# Patient Record
Sex: FEMALE | Race: BLACK OR AFRICAN AMERICAN
[De-identification: names, ages, dates, MRNs, and addresses within clinical notes are randomized per-mention and may not be internally consistent; named-entity substitution may affect disease eponyms.]

---

## 2017-03-26 NOTE — NUR
PT PRESENTED TO THE ER WITH A C/O LEFT EYE BLURRED VISION/DOUBLE VISION X 1.5 
HR. PT AMBULATED TO BED #2 WITH A STEADY GAIT. PT'S FRIEND IS AT THE BEDSIDE.

## 2017-03-26 NOTE — NUR
Patient does not wish to proceed with medical care recommended by Dr. FELIPE. 
Patient given information related to possible complications, up to and 
including death, which could occur as a result of leaving the hospital at this 
time. Patient verbalizes understanding of risks involved due to leaving against 
medical advice. Patient has signed AMA form WITH DR. FELIPE AT THE BEDSIDE. PT 
AMBULATED OUT WITH A STEADY GAIT. PT'S FRIEND IS DRIVING PT HOME.

## 2018-07-01 NOTE — NUR
pt not given med  per pharn  PT. VERBALIZED UNDERSTANDING OF AFTERCARE 
INSTRUCTIONS.Patient discharged to home in stable condition. Written and verbal 
after care instructions given. Patient verbalizes understanding of instruction.

## 2018-07-02 NOTE — NUR
pt ok to discharge per zina stevens. Patient discharged to home in stable 
condition. Written and verbal after care instructions given. Patient verbalizes 
understanding of instruction.Patient is awake and alert to self, day, and 
place. pt ambulatory with a steady gait

## 2019-01-16 NOTE — ERD
ER Documentation


Chief Complaint


Chief Complaint





intermittent pressure like pain @ left chest radiates to right arm





HPI


53-year-old female presents the emergency department complaining of tingling.


Patient states that over the last few days she has had a nonspecific neuropathic


tingling type discomfort in her right arm.  He then traveled to her right leg.  


She reported no focal weakness, difficulty speaking or significant headache.  


She states she has on and off sensation loss to the right upper and lower 


extremity but no weakness or difficulty speaking.  She reports tingling today in


both upper and lower extremity.  She then states she developed a nonspecific 


discomfort in the right side of her chest without shortness of breath or 


hemoptysis.  She states she has been taking Eliquis up until last week.  She has


no other acute complaints including no fevers, sputum production or other 


complaints.





ROS


All systems reviewed and are negative except as per history of present illness.





Medications


Home Meds


Reported Medications


Apixaban* (Eliquis*) 5 Mg Tablet, 5 MG PO BID, TAB


   19


Discontinued Reported Medications


Apixaban* (Eliquis*) 2.5 Mg Tablet, 10 MG PO BID, TAB


   18





Allergies


Allergies:  


Coded Allergies:  


     Penicillins (Verified  Allergy, Severe, 18)


     Sulfa (Sulfonamide Antibiotics) (Verified  Allergy, Severe, 18)


     iodine (Verified  Allergy, Severe, 18)





PMhx/Soc


History of Surgery:  Yes (right knee)


Anesthesia Reaction:  No


Hx Neurological Disorder:  Yes (legs buckle up when walking; numbness tingling 


legs, feet, hands)


Hx Respiratory Disorders:  No


Hx Cardiac Disorders:  No


Hx Psychiatric Problems:  No


Hx Miscellaneous Medical Probl:  Yes (DVT on Left Thigh, PE)


Hx Alcohol Use:  No


Hx Substance Use:  No


Hx Tobacco Use:  No





FmHx


Family history of blood clots





Physical Exam


Vitals





Vital Signs


  Date      Temp  Pulse  Resp  B/P (MAP)   Pulse Ox  O2          O2 Flow    FiO2


Time                                                 Delivery    Rate


   19  98.2     74    19      132/60


     09:55                           (84)





Physical Exam


GENERAL: The patient is well developed and appropriate for usual state of health


in no apparent distress


HEENT: Pupils equal, round, and reactive to light.  EOMI. There is no scleral 


icterus.


NECK: C-spine is soft and supple, there is no meningismus.  There is no cervical


lymphadenopathy.


LUNGS: Clear to auscultation bilaterally. There are no rales, wheezes or 


rhonchi.


HEART: Regular rate and rhythm, no murmurs, clicks, rubs or gallops.


ABDOMEN: Soft, non-tender, non-distended.  There are bowel sounds in all four 


quadrants. No rebound or guarding.


EXTREMITIES: There is no peripheral cyanosis or edema.  No focal swelling or e


rythema.


NEURO: The patient moves all four extremities with 5/5 strength.  Cranial nerves


II - XII are intact. Normal gait. Alert and oriented


SKIN: There is no apparent rash or petechiae.


HEME/LYMPHATIC: There is no evidence of excessive bruising or lymphedema.


PSYCHIATRIC: The patient does not appear anxious or depressed.


Result Diagram:  


19 1022                                                                    


           19 1022





Results 24 hrs





Laboratory Tests


              Test
                                 19
10:22


              White Blood Count                      6.0 10^3/ul


              Red Blood Count                       4.16 10^6/ul


              Hemoglobin                               12.8 g/dl


              Hematocrit                                  39.4 %


              Mean Corpuscular Volume                    94.7 fl


              Mean Corpuscular Hemoglobin                30.8 pg


              Mean Corpuscular Hemoglobin
Concent     32.5 g/dl 



              Red Cell Distribution Width                 12.6 %


              Platelet Count                         192 10^3/UL


              Mean Platelet Volume                       11.2 fl


              Immature Granulocytes %                    0.000 %


              Neutrophils %                               46.9 %


              Lymphocytes %                               42.6 %


              Monocytes %                                  8.7 %


              Eosinophils %                                1.0 %


              Basophils %                                  0.8 %


              Nucleated Red Blood Cells %            0.0 /100WBC


              Immature Granulocytes #              0.000 10^3/ul


              Neutrophils #                          2.8 10^3/ul


              Lymphocytes #                          2.6 10^3/ul


              Monocytes #                            0.5 10^3/ul


              Eosinophils #                          0.1 10^3/ul


              Basophils #                            0.1 10^3/ul


              Nucleated Red Blood Cells #            0.0 10^3/ul


              Prothrombin Time                          12.5 Sec


              Prothrombin Time Ratio                         1.0


              INR International Normalized
Ratio           0.92 



              Activated Partial
Thromboplast Time      27.8 Sec 



              Sodium Level                            146 mmol/L


              Potassium Level                         4.5 mmol/L


              Chloride Level                          104 mmol/L


              Carbon Dioxide Level                     32 mmol/L


              Anion Gap                                       10


              Blood Urea Nitrogen                       12 mg/dl


              Creatinine                              0.81 mg/dl


              Est Glomerular Filtrat Rate
mL/min   > 60 mL/min 



              Glucose Level                             94 mg/dl


              Calcium Level                            9.8 mg/dl


              Total Bilirubin                          0.5 mg/dl


              Direct Bilirubin                        0.00 mg/dl


              Indirect Bilirubin                       0.5 mg/dl


              Aspartate Amino Transf
(AST/SGOT)         32 IU/L 



              Alanine Aminotransferase
(ALT/SGPT)       18 IU/L 



              Alkaline Phosphatase                       68 IU/L


              Troponin I                           < 0.012 ng/ml


              Total Protein                             8.4 g/dl


              Albumin                                   4.5 g/dl


              Globulin                                 3.90 g/dl


              Albumin/Globulin Ratio                        1.15








Procedures/MDM


Patient was taken to a room, seen and evaluated. Comfort measures were 


initiated. 





Diagnostic tests were ordered and reviewed.





3 LEAD RHYTHM STRIP: Normal sinus rhythm without ectopy





EK lead EKG reviewed by myself:


Normal Sinus Rhythm


Normal Bernard and intervals


No ST elevation, depression, or T wave inversion


Impression: Normal EKG





RADIOLOGY: Reviewed with the radiologist





REEVALUATION: 1120: Diagnostic tests were appreciated and discussed with the 


patient.  She remained nontoxic with a normal neurologic examination.





MEDICAL DECISION MAKIN-year-old female with a history of blood clotting 


disorder for uncertain etiology presents the emergency department nonspecific 


neuropathic type discomfort.  From a neurologic standpoint, she has no obvious 


neurologic dysfunction and no indication of stroke.  CAT scan of the brain also 


confirms that there does not seem to be an intracranial cause for her symptoms. 


Furthermore, she has no evidence of acute blood clot with a normal DVT study and


no significant symptoms in her chest.  She is already on Eliquis and my thoughts


are that even if she does have a small pulmonary embolism, she is 


hemodynamically stable without cardiac or pulmonary symptoms and is going to go 


back on the Eliquis regardless.  For this reason, I have opted not to do a CT of


the chest.  At this point, she is otherwise stable and appropriate for 


outpatient supportive care.





Departure


Diagnosis:  


   Primary Impression:  


   Neuropathy


Condition:  Stable


Patient Instructions:  Neuropathy, Peripheral





Additional Instructions:  


See your doctor for follow-up as discussed.  Take a copy of your test results, 


if appropriate, to this follow-up visit.





See your doctor or return here if your symptoms do not improve as expected.





At any time, please return to the emergency department for any change or 


worsening in her symptoms.











BLAYNE RAMIREZ                2019 11:24

## 2019-03-27 NOTE — ERD
ER Documentation


Chief Complaint


Chief Complaint





dizziness x 3 days, also c/o pain right arm





HPI


Patient is a 53-year-old female who presents with pain in the right arm.  She 


also is feeling dizzy.  It started Sunday or Monday.  She has had sharp and 


intermittent pain.  She has had this pain in the past.  She tried Tylenol for 


her pain.  Her primary doctor is Dr. Wang.  Upon review of old medical records


this is the patient's fifth visit to the ER since June 2018.  Review of the 


emergency department information exchange system shows visits to 4 separate 


emergency departments.





ROS


All systems reviewed and are negative except as per history of present illness.





Medications


Home Meds


Active Scripts


Ibuprofen* (Motrin*) 600 Mg Tab, 600 MG PO Q6H PRN for PAIN AND OR ELEVATED 


TEMP, #30 TAB


   Prov:JACQUELINE BUSH MD         3/27/19


Discontinued Reported Medications


Apixaban* (Eliquis*) 5 Mg Tablet, 5 MG PO BID, TAB


   1/16/19





Allergies


Allergies:  


Coded Allergies:  


     Penicillins (Verified  Allergy, Severe, 3/27/19)


     Sulfa (Sulfonamide Antibiotics) (Verified  Allergy, Severe, 3/27/19)


     iodine (Verified  Allergy, Severe, 3/27/19)





PMhx/Soc


History of Surgery:  Yes (R Knee Ortho Surg)


Anesthesia Reaction:  No


Hx Neurological Disorder:  No


Hx Respiratory Disorders:  No


Hx Cardiac Disorders:  No


Hx Psychiatric Problems:  No


Hx Miscellaneous Medical Probl:  Yes (DVT on Left Thigh)


Hx Alcohol Use:  No


Hx Substance Use:  No


Hx Tobacco Use:  No


Smoking Status:  Never smoker





FmHx


Family History:  diabetes





Physical Exam


Vitals





Vital Signs


  Date      Temp  Pulse  Resp  B/P (MAP)   Pulse Ox  O2          O2 Flow    FiO2


Time                                                 Delivery    Rate


   3/27/19  97.8     58    18      114/81       100  Room Air


     22:55                           (92)


   3/27/19  97.8     73    18      129/69       100


     19:58                           (89)





Physical Exam


Const:   No acute distress


Head:   Atraumatic 


Eyes:    Normal Conjunctiva


ENT:    Normal External Ears, Nose and Mouth.


Neck:               Full range of motion. No meningismus.


Resp:   Clear to auscultation bilaterally


Cardio:   Regular rate and rhythm, no murmurs


Abd:    Soft, non tender, non distended. Normal bowel sounds


Skin:   No petechiae or rashes


Back:   No midline or flank tenderness


Ext:    No cyanosis, or edema, 2+ pulses in the radial arteries bilaterally


Neur:   Awake and alert


Psych:    Normal Mood and Affect


Results 24 hrs





Laboratory Tests


                         Test
            3/27/19
21:46


                         Bedside Glucose     104 mg/dL





Current Medications


 Medications
   Dose
          Sig/Guy
       Start Time
   Status  Last


 (Trade)       Ordered        Route
 PRN     Stop Time              Admin
Dose


                              Reason                                Admin


 Ibuprofen
     800 mg         ONCE  ONCE
    3/27/19       DC           3/27/19


(Motrin)                      PO
            21:30
                       21:23



                                             3/27/19 21:31








Procedures/MDM


EKG read by me: 


Rate/Rhythm: Sinus bradycardia rate of 56


Intervals:    Normal


Impression:     Bradycardia without ischemia





Chest X-ray 1V Interpreted by me:


Soft Tissue:                     No acute abnormalities


Bones:                     No acute abnormalities


Mediastinum/Cardiac Silhouette/Lungs:   No acute abnormalities





Accu-Chek is normal.





Patient is a 53-year-old female who presents with what appears to be a cervical 


radiculopathy.  EKG, chest x-ray, and Accu-Chek were normal.  I believe 


outpatient management is appropriate.  I doubt acute coronary syndrome or 


stroke.  Patient will need to follow-up closely with her primary doctor within 


24-48 hours for reevaluation.  She will be given ibuprofen for pain and 


inflammation reduction.





Departure


Diagnosis:  


   Primary Impression:  


   Cervical radiculopathy


   Additional Impression:  


   Dizziness


Condition:  Fair


Patient Instructions:  Dizziness, Unk Cause, Radiculopathy, Cervical


Referrals:  


MIGUEL WANG MD





Additional Instructions:  


Call your primary care doctor TOMORROW for an appointment during the next 1-2 


days.See the doctor sooner or return here if your condition worsens before your 


appointment time.











JACQUELINE BUSH MD                Mar 27, 2019 23:18

## 2019-04-08 NOTE — ERD
ER Documentation


Chief Complaint


Chief Complaint


chest tightness intermittently





HPI


The patient is a 53-year-old female, presenting to the ER because of 


intermittent chest tightness for the last few days, burning sensation, cough, 


worse in the evening when she lays down, improve when she elevates the head of 


the bed.  She had symptom in the past from gastroesophageal reflux disease, she 


stopped taking her medication.  She denies fever, chills, chest pain with 


vomiting/radiation/exertion/diaphoresis, dyspnea, abdominal pain, vomiting, 


dysuria.  She is under a lot of stress, she is flying to [place] because of a 


death in the family, she does not smoke nor drink





Medical history: History of DVT, GERD she did complete 6 months of Eliquis and 


recently stopped in 2019





Past surgical history: Right knee arthroscopy many years ago





ROS


All systems reviewed and are negative except as per history of present illness.





Medications


Home Meds


Active Scripts


Pantoprazole* (Protonix*) 40 Mg Tablet.dr, 40 MG PO DAILY, #10 TAB


   Prov:TELMA PICKARD MD         19


Discontinued Scripts


Ibuprofen* (Motrin*) 600 Mg Tab, 600 MG PO Q6H PRN for PAIN AND OR ELEVATED 


TEMP, #30 TAB


   Prov:JACQUELINE BUSH MD         3/27/19





Allergies


Allergies:  


Coded Allergies:  


     Penicillins (Verified  Allergy, Severe, 19)


     Sulfa (Sulfonamide Antibiotics) (Verified  Allergy, Severe, 19)


     iodine (Verified  Allergy, Severe, 19)





PMhx/Soc


History of Surgery:  Yes (R Knee Ortho Surg)


Anesthesia Reaction:  No


Hx Neurological Disorder:  No


Hx Respiratory Disorders:  No


Hx Cardiac Disorders:  No


Hx Psychiatric Problems:  No


Hx Miscellaneous Medical Probl:  Yes (DVT on Left Thigh)


Hx Alcohol Use:  No


Hx Substance Use:  No


Hx Tobacco Use:  No





Physical Exam


Vitals





Vital Signs


  Date      Temp  Pulse  Resp  B/P (MAP)   Pulse Ox  O2          O2 Flow    FiO2


Time                                                 Delivery    Rate


    19           75    16      113/86       100  Room Air


     18:46                           (95)


    19           73    16      126/86       100  Room Air


     17:40                           (99)


    19  97.8     74    18      141/79       100


     16:12                           (99)





Physical Exam


 Const:      No acute distress.


 Head:        Atraumatic.


 Eyes:       Normal Conjunctiva.


 ENT:         Normal External Ears, Nose and Mouth.


 Neck:        Full range of motion.  No meningismus.


 Resp:         Clear to auscultation bilaterally.


 Cardio:       Regular rate and rhythm.


 Abd:         Soft,  non distended, normal bowel sounds, non tender.


 Skin:         No petechiae or rashes.


 Back:        No midline or flank tenderness.


 Ext:          No cyanosis, or edema.


 Neur:        Awake and alert. No focal deficit


 Psych:        Normal Mood and Affect.


Result Diagram:  


19





Results 24 hrs





Laboratory Tests


              Test
                                  19
18:06


              White Blood Count                      6.2 10^3/ul


              Red Blood Count                       4.12 10^6/ul


              Hemoglobin                               12.6 g/dl


              Hematocrit                                  38.1 %


              Mean Corpuscular Volume                    92.5 fl


              Mean Corpuscular Hemoglobin                30.6 pg


              Mean Corpuscular Hemoglobin
Concent     33.1 g/dl 



              Red Cell Distribution Width                 12.9 %


              Platelet Count                         210 10^3/UL


              Mean Platelet Volume                       10.6 fl


              Immature Granulocytes %                    0.000 %


              Neutrophils %                               44.6 %


              Lymphocytes %                               42.6 %


              Monocytes %                                 10.5 %


              Eosinophils %                                1.5 %


              Basophils %                                  0.8 %


              Nucleated Red Blood Cells %            0.0 /100WBC


              Immature Granulocytes #              0.000 10^3/ul


              Neutrophils #                          2.8 10^3/ul


              Lymphocytes #                          2.6 10^3/ul


              Monocytes #                            0.7 10^3/ul


              Eosinophils #                          0.1 10^3/ul


              Basophils #                            0.1 10^3/ul


              Nucleated Red Blood Cells #            0.0 10^3/ul


              D-Dimer                               321.26 ng/ml


              D-Dimer Comment


              Sodium Level                            141 mmol/L


              Potassium Level                         4.0 mmol/L


              Chloride Level                          105 mmol/L


              Carbon Dioxide Level                     27 mmol/L


              Anion Gap                                        9


              Blood Urea Nitrogen                       18 mg/dl


              Creatinine                              0.91 mg/dl


              Est Glomerular Filtrat Rate
mL/min   > 60 mL/min 



              Glucose Level                             81 mg/dl


              Calcium Level                            9.9 mg/dl


              Troponin I                           < 0.012 ng/ml





Current Medications


 Medications
   Dose
          Sig/Guy
       Start Time
   Status  Last


 (Trade)       Ordered        Route
 PRN     Stop Time              Admin
Dose


                              Reason                                Admin


                40 mg          ONCE  ONCE
    19        DC       



Pantoprazole
                 IV
            18:00
 19


 (Protonix                                   18:18


Iv)


                40 mg          ONCE  ONCE
    4/8/19        DC       



Pantoprazole
                 PO
            18:30
 19


 (Protonix                                   18:31


Tab)








Procedures/MDM


                          [place] [id number]


                        Radiology Main Line: [phone]





                            DIAGNOSTIC IMAGING REPORT





Patient: JENNIFER FERNANDEZ   : 1965   Age: 53  Sex: F                      


 


       MR #:    [id number]   Acct #:   [id number]    DOS: 19 1746


Ordering MD: TELMA PICKARD MD   Location:  E/R   Room/Bed:                    


                       


                                        


PROCEDURE:   XR Chest.


 


CLINICAL INDICATION:   Chest pain .


 


TECHNIQUE:   Single frontal chest x-ray. 


 


COMPARISON:    CHEST 3/27/2019


 


FINDINGS:


The lungs are clear of acute infiltrates, edema, effusions, or masses..  The 


cardiomediastinal silhouette is unremarkable. The osseous structures are intact.


  


 


IMPRESSION:


No acute cardiopulmonary disease.   


 


RPTAT: GG


_____________________________________________ 


.Gualberto Rivas MD, MD           Date    Time 


Electronically viewed and signed by .Gualberto Rivas MD, MD on 2019 18:52 


 


D:  2019 18:52  T:  2019 18:52


.L/





CC: TELMA PICKARD MD





[phone]





EKG:                           Read by emergency physician


Rate/Rhythm:          Normal Sinus Rhythm 64  beats/min


QRS, ST, T-waves:    No ST elevation, no T inversion, sinus arrhythmia


Impression:             Normal   EKG





MEDICAL MAKING DECISION: The patient is a 53-year-old female, presenting with 


acute chest pressure, most likely due to chronic GERD.  She was off her Protonix


either IV or p.o. but she declined.  I do not suspect acute ACS





The differential diagnoses considered include but are not limited to acute 


coronary syndrome, acute myocardial infarction, pericarditis, pulmonary 


embolism, aortic dissection, pneumonia, pleural effusion, pneumothorax, GERD, 


chest wall pain.





Departure


Diagnosis:  


   Primary Impression:  


   Chest pressure


Condition:  Good


Comments


She was discharged with Protonix





I discussed the findings with the patient. I advised the patient to follow-up 


with the primary physician in about 2-3 days, sooner if needed and return if any


concern.





Disclaimer: Inadvertent spelling and grammatical errors are likely due to 


EHR/dictation software use and do not reflect on the overall quality of patient 


care. Also, please note that the electronic time recorded on this note does not 


necessarily reflect the actual time of the patient encounter.











TELMA PICKARD MD               2019 17:31

## 2019-06-08 NOTE — ERD
ER Documentation


Chief Complaint


Chief Complaint





constipation on and off x few weeks , last bowel movement on tuesday





HPI


54 year old female presenting to the ED for Constipation x 10 days and has left 


crampy abdominal pain. Patient states that she had a small bowl movement on 


Wednesday.  Patient states this has never happen to her before and denies N/V.  


Patient states her last colonoscopy was in 2014 and was normal. Patient states 


this happen to her a few years ago and she was give Golytely which fixed her 


constipation. Patient denies any allergies to medications.





ROS


All systems reviewed and are negative except as per history of present illness.





Medications


Home Meds


Active Scripts


Peg 3350/Na Sulf,Bicarb,Cl/KCl (Golytely Packet) 1 Each Powd.pack, 1 EACH PO QAM


for 7 Days


   Prov:SAMANTHA AUGUSTINE PA-C         6/8/19


Pantoprazole* (Protonix*) 40 Mg Tablet., 40 MG PO DAILY, #10 TAB


   Prov:TELMA PICKARD MD         4/8/19





Allergies


Allergies:  


Coded Allergies:  


     Penicillins (Verified  Allergy, Severe, 4/8/19)


     Sulfa (Sulfonamide Antibiotics) (Verified  Allergy, Severe, 4/8/19)


     iodine (Verified  Allergy, Severe, 4/8/19)





PMhx/Soc


History of Surgery:  Yes (R Knee Ortho Surg)


Anesthesia Reaction:  No


Hx Neurological Disorder:  No


Hx Respiratory Disorders:  No


Hx Cardiac Disorders:  No


Hx Psychiatric Problems:  No


Hx Miscellaneous Medical Probl:  Yes (DVT on Left Thigh, Gerd)


Hx Alcohol Use:  No


Hx Substance Use:  No


Hx Tobacco Use:  No


Smoking Status:  Never smoker





FmHx


Family History:  No diabetes, No coronary disease, No other





Physical Exam


Vitals





Vital Signs


  Date      Temp  Pulse  Resp  B/P (MAP)   Pulse Ox  O2          O2 Flow    FiO2


Time                                                 Delivery    Rate


    6/8/19  98.1     80    18      118/74       100


     14:33                           (89)





Physical Exam


Const:   No acute distress


Head:   Atraumatic 


Eyes:    Normal Conjunctiva


ENT:    Normal External Ears, Nose and Mouth.


Neck:               Full range of motion. No meningismus.


Resp:   Clear to auscultation bilaterally


Cardio:   Regular rate and rhythm, no murmurs


Abd:    LLQ pain on palpation, NO CVA tenderness,


Result Diagram:  


6/8/19 1543                                                                     


          6/8/19 1543





Results 24 hrs





Laboratory Tests


              Test
                                   6/8/19
15:43


              White Blood Count                       4.7 10^3/ul


              Red Blood Count                        4.06 10^6/ul


              Hemoglobin                                12.4 g/dl


              Hematocrit                                   38.1 %


              Mean Corpuscular Volume                     93.8 fl


              Mean Corpuscular Hemoglobin                 30.5 pg


              Mean Corpuscular Hemoglobin
Concent      32.5 g/dl 



              Red Cell Distribution Width                  13.2 %


              Platelet Count                          192 10^3/UL


              Mean Platelet Volume                        10.5 fl


              Immature Granulocytes %                     0.000 %


              Neutrophils %                                37.3 %


              Lymphocytes %                                49.8 %


              Monocytes %                                  10.2 %


              Eosinophils %                                 1.9 %


              Basophils %                                   0.8 %


              Nucleated Red Blood Cells %             0.0 /100WBC


              Immature Granulocytes #               0.000 10^3/ul


              Neutrophils #                           1.8 10^3/ul


              Lymphocytes #                           2.4 10^3/ul


              Monocytes #                             0.5 10^3/ul


              Eosinophils #                           0.1 10^3/ul


              Basophils #                             0.0 10^3/ul


              Nucleated Red Blood Cells #             0.0 10^3/ul


              Urine Color                          STRAW


              Urine Clarity                        CLEAR


              Urine pH                                        5.0


              Urine Specific Gravity                        1.009


              Urine Ketones                        NEGATIVE mg/dL


              Urine Nitrite                        NEGATIVE mg/dL


              Urine Bilirubin                      NEGATIVE mg/dL


              Urine Urobilinogen                   NEGATIVE mg/dL


              Urine Leukocyte Esterase
            NEGATIVE
[place]/ul


              Urine Microscopic RBC                        1 /HPF


              Urine Microscopic WBC                        2 /HPF


              Urine Hemoglobin                           1+ mg/dL


              Urine Glucose                        NEGATIVE mg/dL


              Urine Total Protein                  NEGATIVE mg/dl


              Sodium Level                             141 mmol/L


              Potassium Level                          3.9 mmol/L


              Chloride Level                           105 mmol/L


              Carbon Dioxide Level                      29 mmol/L


              Anion Gap                                         7


              Blood Urea Nitrogen                        11 mg/dl


              Creatinine                               0.80 mg/dl


              Est Glomerular Filtrat Rate
mL/min   > 60 mL/min 



              Glucose Level                              74 mg/dl


              Calcium Level                             9.3 mg/dl


              Total Bilirubin                           0.5 mg/dl


              Direct Bilirubin                         0.00 mg/dl


              Indirect Bilirubin                        0.5 mg/dl


              Aspartate Amino Transf
(AST/SGOT)          48 IU/L 



              Alanine Aminotransferase
(ALT/SGPT)        16 IU/L 



              Alkaline Phosphatase                        74 IU/L


              Total Protein                              8.3 g/dl


              Albumin                                    4.2 g/dl


              Globulin                                  4.10 g/dl


              Albumin/Globulin Ratio                         1.02


              Lipase                                       70 U/L








Procedures/MDM


ED Course:


Physical exam


Rectal examination: [facility]


CT


 








Imaging


PROCEDURE:


  CT Abdomen and Pelvis Without Intravenous Contrast


 


CLINICAL INDICATION:


  Abdominal pain. Rule out bowel obstruction or constipation.


 


TECHNIQUE:


  Axial computed tomography images of the abdomen and pelvis without intravenous


contrast.  Sagittal and coronal reformatted images were created and reviewed.  


CTDIvol (mGy) = 6.8; total DLP (mGy-cm) = 358.6.  This CT exam was performed 


using one or more of the following dose reduction techniques:  automated 


exposure control, adjustment of the mA and/or kV according to patient size, 


and/or use of iterative reconstruction technique.  DICOM images are available.


 


COMPARISON:


  None


 


FINDINGS:


  LIMITATIONS:  Lack of IV and oral contrast limits sensitivity of exam.


  LUNG BASES:  Unremarkable.  No mass.  No consolidation.


 


 ABDOMEN:


  LIVER:  Unremarkable.


  GALLBLADDER AND BILE DUCTS:  Unremarkable.  No calcified stones.  No ductal 


dilation.


  PANCREAS:  Unremarkable.  No ductal dilation.


  SPLEEN:  Unremarkable.  No splenomegaly.


  ADRENALS:  Unremarkable.  No mass.


  KIDNEYS AND URETERS:  Unremarkable.  No obstructing stones.  No 


hydronephrosis.


  STOMACH AND BOWEL:  Mild retained feces throughout the colon consistent with 


constipation.  No obstruction.  No mucosal thickening.


 


 PELVIS:


  APPENDIX:  No findings to suggest acute appendicitis.


  BLADDER:  Unremarkable.  No stones.


  REPRODUCTIVE:  Unremarkable as visualized.


 


 ABDOMEN and PELVIS:


  INTRAPERITONEAL SPACE:  Unremarkable.  No free air.  No significant fluid 


collection.


  BONES/JOINTS:  No acute fracture.  No dislocation.


  SOFT TISSUES:  Unremarkable.


  VASCULATURE:  Unremarkable.  No abdominal aortic aneurysm.


  LYMPH NODES:  Unremarkable.  No enlarged lymph nodes.


 


IMPRESSION:     


 


1.  Mild retained feces throughout the colon consistent with constipation.


 


2.  Otherwise unremarkable noncontrast CT abdomen and pelvis without acute pa


thology identified. No evidence of bowel dilatation or obstruction.








MDM





Patient 54 year old female presenting with constipation x 10 days. Patient 


physical exam revealed mild tenderness to palpation to the left lower abdominal 


region.  A rectal exam revealed no impaction of feces.  Patient was sent for 


abdominal CT which showed no evidence of bowel dilation or obstruction.  Patient


was offered treatment of magnesium citrate in the ER, but the patient states she


prefers to do a treatment at home.  The patient states that Golytely worked for 


her last time this happen.  At this time of discharge I have low suspicion for 


appendicitis, cholecystitis, choledocholithiasis, cholangitis, pancreatitis, 


bowel obstruction, pyelonephritis, hepatitis, perforated viscus, diverticulitis,


strangulated hernia , acute abdomen, AAA, aortic dissection, DKA, Mesenteric 


ischemia, or any emergent conditions.  The patient has good follow up care and 


was advised to follow up with her PCP in 1-2 days regarding this visit. The 


patient was given a prescription fo Golytel. The patient was advised if symptoms


worsen return the ER immediately. The patient had no further questions upon 


discharge.





Departure


Diagnosis:  


   Primary Impression:  


   Constipation


   Constipation type:  slow transit constipation  Qualified Codes:  K59.01 - 


   Slow transit constipation


Condition:  Stable


Patient Instructions:  Constipation (Adult)


Referrals:  


[facility]


YOU HAVE RECEIVED A MEDICAL SCREENING EXAM AND THE RESULTS INDICATE THAT YOU DO 


NOT HAVE A CONDITION THAT REQUIRES URGENT TREATMENT IN THE EMERGENCY DEPARTMENT.





FURTHER EVALUATION AND TREATMENT OF YOUR CONDITION CAN WAIT UNTIL YOU ARE SEEN 


IN YOUR DOCTORS OFFICE WITHIN THE NEXT 1-2 DAYS. IT IS YOUR RESPONSIBILITY TO 


MAKE AN APPOINTMENT FOR FOLOW-UP CARE.





IF YOU HAVE A PRIMARY DOCTOR


--you should call your primary doctor and schedule an appointment





IF YOU DO NOT HAVE A PRIMARY DOCTOR YOU CAN CALL OUR PHYSICIAN REFERRAL HOTLINE 


AT


 [phone] 





IF YOU CAN NOT AFFORD TO SEE A PHYSICIAN YOU CAN CHOSE FROM THE FOLLOWING 


[facility] [phone] [address] [facility]. [facility] [phone] [address] [facility]. [facility] [phone] [address] [facility] [phone] [address] [facility]. [facility] [phone]


[address] [facility]. [facility]. [phone]


[address] [facility]


YOU HAVE RECEIVED A MEDICAL SCREENING EXAM AND THE RESULTS INDICATE THAT YOU DO 


NOT HAVE A CONDITION THAT REQUIRES URGENT TREATMENT IN THE EMERGENCY DEPARTMENT.





FURTHER EVALUATION AND TREATMENT OF YOUR CONDITION CAN WAIT UNTIL YOU ARE SEEN 


IN YOUR DOCTORS OFFICE WITHIN THE NEXT 1-2 DAYS. IT IS YOUR RESPONSIBILITY TO 


MAKE AN APPOINTMENT FOR FOLOW-UP CARE.





IF YOU HAVE A PRIMARY DOCTOR


--you should call your primary doctor and schedule and appointment





IF YOU DO NOT HAVE A PRIMARY DOCTOR YOU CAN CALL OUR PHYSICIAN REFERRAL HOTLINE 


AT [phone].





IF YOU CAN NOT AFFORD TO SEE A PHYSICIAN YOU CAN CHOSE FROM THE FOLLOWING [facility]


INSTITUTIONS:





[facility]


[address] [place] [address]





[facility]


[address] [place] [address]





[facility]


[address] [place] [address]











SAMANTHA AUGUSTINE PA-C                Jun 8, 2019 20:58

## 2019-09-14 ENCOUNTER — HOSPITAL ENCOUNTER (EMERGENCY)
Dept: HOSPITAL 91 - E/R | Age: 54
Discharge: HOME | End: 2019-09-14
Payer: COMMERCIAL

## 2019-09-14 ENCOUNTER — HOSPITAL ENCOUNTER (EMERGENCY)
Dept: HOSPITAL 10 - E/R | Age: 54
Discharge: HOME | End: 2019-09-14
Payer: COMMERCIAL

## 2019-09-14 VITALS
WEIGHT: 141.54 LBS | BODY MASS INDEX: 21.45 KG/M2 | BODY MASS INDEX: 21.45 KG/M2 | HEIGHT: 68 IN | WEIGHT: 141.54 LBS | HEIGHT: 68 IN

## 2019-09-14 VITALS — RESPIRATION RATE: 18 BRPM | HEART RATE: 78 BPM | DIASTOLIC BLOOD PRESSURE: 69 MMHG | SYSTOLIC BLOOD PRESSURE: 102 MMHG

## 2019-09-14 DIAGNOSIS — R20.2: Primary | ICD-10-CM

## 2019-09-14 LAB
ADD MAN DIFF?: NO
ADD UMIC: YES
ALANINE AMINOTRANSFERASE: 23 IU/L (ref 13–69)
ALBUMIN/GLOBULIN RATIO: 1.15
ALBUMIN: 4.5 G/DL (ref 3.3–4.9)
ALKALINE PHOSPHATASE: 66 IU/L (ref 42–121)
ANION GAP: 6 (ref 5–13)
ASPARTATE AMINO TRANSFERASE: 32 IU/L (ref 15–46)
BASOPHIL #: 0 10^3/UL (ref 0–0.1)
BASOPHILS %: 0.6 % (ref 0–2)
BILIRUBIN,DIRECT: 0 MG/DL (ref 0–0.2)
BILIRUBIN,TOTAL: 0.4 MG/DL (ref 0.2–1.3)
BLOOD UREA NITROGEN: 21 MG/DL (ref 7–20)
CALCIUM: 9.3 MG/DL (ref 8.4–10.2)
CARBON DIOXIDE: 30 MMOL/L (ref 21–31)
CHLORIDE: 104 MMOL/L (ref 97–110)
CREATININE: 0.94 MG/DL (ref 0.44–1)
EOSINOPHILS #: 0.1 10^3/UL (ref 0–0.5)
EOSINOPHILS %: 0.8 % (ref 0–7)
GLOBULIN: 3.9 G/DL (ref 1.3–3.2)
GLUCOSE: 93 MG/DL (ref 70–220)
HEMATOCRIT: 40.3 % (ref 37–47)
HEMOGLOBIN: 13.1 G/DL (ref 12–16)
IMMATURE GRANS #M: 0.01 10^3/UL (ref 0–0.03)
IMMATURE GRANS % (M): 0.2 % (ref 0–0.43)
LIPASE: 94 U/L (ref 23–300)
LYMPHOCYTES #: 2.5 10^3/UL (ref 0.8–2.9)
LYMPHOCYTES %: 39.1 % (ref 15–51)
MEAN CORPUSCULAR HEMOGLOBIN: 30.8 PG (ref 29–33)
MEAN CORPUSCULAR HGB CONC: 32.5 G/DL (ref 32–37)
MEAN CORPUSCULAR VOLUME: 94.8 FL (ref 82–101)
MEAN PLATELET VOLUME: 10.6 FL (ref 7.4–10.4)
MONOCYTE #: 0.5 10^3/UL (ref 0.3–0.9)
MONOCYTES %: 7.3 % (ref 0–11)
NEUTROPHIL #: 3.3 10^3/UL (ref 1.6–7.5)
NEUTROPHILS %: 52 % (ref 39–77)
NUCLEATED RED BLOOD CELLS #: 0 10^3/UL (ref 0–0)
NUCLEATED RED BLOOD CELLS%: 0 /100WBC (ref 0–0)
PLATELET COUNT: 197 10^3/UL (ref 140–415)
POTASSIUM: 4.4 MMOL/L (ref 3.5–5.1)
RED BLOOD COUNT: 4.25 10^6/UL (ref 4.2–5.4)
RED CELL DISTRIBUTION WIDTH: 13 % (ref 11.5–14.5)
SODIUM: 140 MMOL/L (ref 135–144)
TOTAL PROTEIN: 8.4 G/DL (ref 6.1–8.1)
UR ASCORBIC ACID: NEGATIVE MG/DL
UR BILIRUBIN (DIP): NEGATIVE MG/DL
UR BLOOD (DIP): (no result) MG/DL
UR CLARITY: CLEAR
UR COLOR: (no result)
UR GLUCOSE (DIP): NEGATIVE MG/DL
UR KETONES (DIP): NEGATIVE MG/DL
UR LEUKOCYTE ESTERASE (DIP): (no result) LEU/UL
UR NITRITE (DIP): NEGATIVE MG/DL
UR PH (DIP): 6 (ref 5–9)
UR RBC: 1 /HPF (ref 0–5)
UR SPECIFIC GRAVITY (DIP): 1.01 (ref 1–1.03)
UR SQUAMOUS EPITHELIAL CELL: (no result) /HPF
UR TOTAL PROTEIN (DIP): NEGATIVE MG/DL
UR UROBILINOGEN (DIP): NEGATIVE MG/DL
UR WBC: 4 /HPF (ref 0–5)
WHITE BLOOD COUNT: 6.3 10^3/UL (ref 4.8–10.8)

## 2019-09-14 PROCEDURE — 36415 COLL VENOUS BLD VENIPUNCTURE: CPT

## 2019-09-14 PROCEDURE — 81001 URINALYSIS AUTO W/SCOPE: CPT

## 2019-09-14 PROCEDURE — 85025 COMPLETE CBC W/AUTO DIFF WBC: CPT

## 2019-09-14 PROCEDURE — 84703 CHORIONIC GONADOTROPIN ASSAY: CPT

## 2019-09-14 PROCEDURE — 80053 COMPREHEN METABOLIC PANEL: CPT

## 2019-09-14 PROCEDURE — 83690 ASSAY OF LIPASE: CPT

## 2019-09-14 PROCEDURE — 70450 CT HEAD/BRAIN W/O DYE: CPT

## 2019-09-14 PROCEDURE — 99285 EMERGENCY DEPT VISIT HI MDM: CPT

## 2019-09-16 ENCOUNTER — HOSPITAL ENCOUNTER (EMERGENCY)
Dept: HOSPITAL 10 - E/R | Age: 54
LOS: 1 days | Discharge: HOME | End: 2019-09-17
Payer: COMMERCIAL

## 2019-09-16 ENCOUNTER — HOSPITAL ENCOUNTER (EMERGENCY)
Dept: HOSPITAL 91 - E/R | Age: 54
LOS: 1 days | Discharge: HOME | End: 2019-09-17
Payer: COMMERCIAL

## 2019-09-16 VITALS
HEIGHT: 68 IN | HEIGHT: 68 IN | WEIGHT: 143.52 LBS | WEIGHT: 143.52 LBS | BODY MASS INDEX: 21.75 KG/M2 | BODY MASS INDEX: 21.75 KG/M2

## 2019-09-16 DIAGNOSIS — R51: Primary | ICD-10-CM

## 2019-09-16 PROCEDURE — 99282 EMERGENCY DEPT VISIT SF MDM: CPT

## 2019-09-17 VITALS — DIASTOLIC BLOOD PRESSURE: 80 MMHG | RESPIRATION RATE: 14 BRPM | HEART RATE: 78 BPM | SYSTOLIC BLOOD PRESSURE: 138 MMHG

## 2021-05-29 ENCOUNTER — RECORDS - HEALTHEAST (OUTPATIENT)
Dept: ADMINISTRATIVE | Facility: CLINIC | Age: 56
End: 2021-05-29

## 2022-01-11 ENCOUNTER — HOSPITAL ENCOUNTER (EMERGENCY)
Dept: HOSPITAL 54 - ER | Age: 57
Discharge: HOME | End: 2022-01-11
Payer: COMMERCIAL

## 2022-01-11 VITALS — BODY MASS INDEX: 20.76 KG/M2 | WEIGHT: 137 LBS | HEIGHT: 68 IN

## 2022-01-11 VITALS — SYSTOLIC BLOOD PRESSURE: 105 MMHG | DIASTOLIC BLOOD PRESSURE: 68 MMHG

## 2022-01-11 DIAGNOSIS — Z88.1: ICD-10-CM

## 2022-01-11 DIAGNOSIS — Z88.2: ICD-10-CM

## 2022-01-11 DIAGNOSIS — E86.0: Primary | ICD-10-CM

## 2022-01-11 DIAGNOSIS — Z98.890: ICD-10-CM

## 2022-01-11 DIAGNOSIS — Z91.010: ICD-10-CM

## 2022-01-11 DIAGNOSIS — Z91.048: ICD-10-CM

## 2022-01-11 DIAGNOSIS — R51.9: ICD-10-CM

## 2022-01-11 DIAGNOSIS — Z60.2: ICD-10-CM

## 2022-01-11 LAB
ALBUMIN SERPL BCP-MCNC: 3.5 G/DL (ref 3.4–5)
ALP SERPL-CCNC: 63 U/L (ref 46–116)
ALT SERPL W P-5'-P-CCNC: 18 U/L (ref 12–78)
AST SERPL W P-5'-P-CCNC: 19 U/L (ref 15–37)
BASOPHILS # BLD AUTO: 0 K/UL (ref 0–0.2)
BASOPHILS NFR BLD AUTO: 0.9 % (ref 0–2)
BILIRUB DIRECT SERPL-MCNC: 0.1 MG/DL (ref 0–0.2)
BILIRUB SERPL-MCNC: 0.2 MG/DL (ref 0.2–1)
BUN SERPL-MCNC: 25 MG/DL (ref 7–18)
CALCIUM SERPL-MCNC: 9 MG/DL (ref 8.5–10.1)
CHLORIDE SERPL-SCNC: 104 MMOL/L (ref 98–107)
CO2 SERPL-SCNC: 26 MMOL/L (ref 21–32)
CREAT SERPL-MCNC: 1 MG/DL (ref 0.6–1.3)
EOSINOPHIL NFR BLD AUTO: 2.1 % (ref 0–6)
GLUCOSE SERPL-MCNC: 96 MG/DL (ref 74–106)
HCT VFR BLD AUTO: 35 % (ref 33–45)
HGB BLD-MCNC: 11.8 G/DL (ref 11.5–14.8)
LYMPHOCYTES NFR BLD AUTO: 2.2 K/UL (ref 0.8–4.8)
LYMPHOCYTES NFR BLD AUTO: 42.1 % (ref 20–44)
MCHC RBC AUTO-ENTMCNC: 33 G/DL (ref 31–36)
MCV RBC AUTO: 92 FL (ref 82–100)
MONOCYTES NFR BLD AUTO: 0.5 K/UL (ref 0.1–1.3)
MONOCYTES NFR BLD AUTO: 10 % (ref 2–12)
NEUTROPHILS # BLD AUTO: 2.3 K/UL (ref 1.8–8.9)
NEUTROPHILS NFR BLD AUTO: 44.9 % (ref 43–81)
PLATELET # BLD AUTO: 195 K/UL (ref 150–450)
POTASSIUM SERPL-SCNC: 4 MMOL/L (ref 3.5–5.1)
PROT SERPL-MCNC: 7.6 G/DL (ref 6.4–8.2)
RBC # BLD AUTO: 3.86 MIL/UL (ref 4–5.2)
SODIUM SERPL-SCNC: 141 MMOL/L (ref 136–145)
WBC NRBC COR # BLD AUTO: 5.1 K/UL (ref 4.3–11)

## 2022-01-11 SDOH — SOCIAL STABILITY - SOCIAL INSECURITY: PROBLEMS RELATED TO LIVING ALONE: Z60.2

## 2022-01-11 NOTE — NUR
PATIENT [id number]. HEADACHE SINCE THIS MORNING, FACE TINGLING X 1 HR. NO NEURO 
DEFICIT. PATIENT IS A/O X 4, RR EVEN AND UNLABORED, NO SOB NOTED. PATIENT 
CONNECTED TO CARDIAC MONITOR AND POX.